# Patient Record
Sex: MALE | Race: WHITE | NOT HISPANIC OR LATINO | Employment: OTHER | RURAL
[De-identification: names, ages, dates, MRNs, and addresses within clinical notes are randomized per-mention and may not be internally consistent; named-entity substitution may affect disease eponyms.]

---

## 2021-05-03 ENCOUNTER — APPOINTMENT (OUTPATIENT)
Dept: LAB | Facility: CLINIC | Age: 71
End: 2021-05-03
Payer: MEDICARE

## 2021-05-03 DIAGNOSIS — Z00.00 ROUTINE GENERAL MEDICAL EXAMINATION AT A HEALTH CARE FACILITY: Primary | ICD-10-CM

## 2021-05-03 LAB
CTP QC/QA: YES
SARS-COV-2 AG RESP QL IA.RAPID: NEGATIVE

## 2021-05-03 PROCEDURE — 87426 SARSCOV CORONAVIRUS AG IA: CPT | Mod: RHCUB | Performed by: FAMILY MEDICINE

## 2022-10-25 ENCOUNTER — OFFICE VISIT (OUTPATIENT)
Dept: DERMATOLOGY | Facility: CLINIC | Age: 72
End: 2022-10-25
Payer: MEDICARE

## 2022-10-25 VITALS — BODY MASS INDEX: 31.39 KG/M2 | HEIGHT: 67 IN | WEIGHT: 200 LBS | RESPIRATION RATE: 18 BRPM

## 2022-10-25 DIAGNOSIS — L57.0 ACTINIC KERATOSES: ICD-10-CM

## 2022-10-25 DIAGNOSIS — D48.9 NEOPLASM OF UNCERTAIN BEHAVIOR: Primary | ICD-10-CM

## 2022-10-25 DIAGNOSIS — L82.1 SEBORRHEIC KERATOSES: ICD-10-CM

## 2022-10-25 PROCEDURE — 11311 PR SHAV SKIN LES 0.6-1.0 CM FACE,FACIAL: ICD-10-PCS | Mod: ,,, | Performed by: STUDENT IN AN ORGANIZED HEALTH CARE EDUCATION/TRAINING PROGRAM

## 2022-10-25 PROCEDURE — 17000 PR DESTRUCTION(LASER SURGERY,CRYOSURGERY,CHEMOSURGERY),PREMALIGNANT LESIONS,FIRST LESION: ICD-10-PCS | Mod: XS,,, | Performed by: STUDENT IN AN ORGANIZED HEALTH CARE EDUCATION/TRAINING PROGRAM

## 2022-10-25 PROCEDURE — 99203 PR OFFICE/OUTPT VISIT, NEW, LEVL III, 30-44 MIN: ICD-10-PCS | Mod: 25,,, | Performed by: STUDENT IN AN ORGANIZED HEALTH CARE EDUCATION/TRAINING PROGRAM

## 2022-10-25 PROCEDURE — 17003 DESTRUCTION, PREMALIGNANT LESIONS; SECOND THROUGH 14 LESIONS: ICD-10-PCS | Mod: ,,, | Performed by: STUDENT IN AN ORGANIZED HEALTH CARE EDUCATION/TRAINING PROGRAM

## 2022-10-25 PROCEDURE — 17000 DESTRUCT PREMALG LESION: CPT | Mod: XS,,, | Performed by: STUDENT IN AN ORGANIZED HEALTH CARE EDUCATION/TRAINING PROGRAM

## 2022-10-25 PROCEDURE — 99203 OFFICE O/P NEW LOW 30 MIN: CPT | Mod: 25,,, | Performed by: STUDENT IN AN ORGANIZED HEALTH CARE EDUCATION/TRAINING PROGRAM

## 2022-10-25 PROCEDURE — 11311 SHAVE SKIN LESION 0.6-1.0 CM: CPT | Mod: ,,, | Performed by: STUDENT IN AN ORGANIZED HEALTH CARE EDUCATION/TRAINING PROGRAM

## 2022-10-25 PROCEDURE — 17003 DESTRUCT PREMALG LES 2-14: CPT | Mod: ,,, | Performed by: STUDENT IN AN ORGANIZED HEALTH CARE EDUCATION/TRAINING PROGRAM

## 2022-10-25 RX ORDER — MULTIVITAMIN
1 TABLET ORAL
COMMUNITY

## 2022-10-25 RX ORDER — ALBUTEROL SULFATE 0.63 MG/3ML
0.63 SOLUTION RESPIRATORY (INHALATION) EVERY 6 HOURS PRN
COMMUNITY

## 2022-10-25 RX ORDER — MONTELUKAST SODIUM 10 MG/1
TABLET ORAL
COMMUNITY
Start: 2021-12-17

## 2022-10-25 RX ORDER — ESOMEPRAZOLE MAGNESIUM 40 MG/1
1 CAPSULE, DELAYED RELEASE ORAL
COMMUNITY

## 2022-10-25 RX ORDER — TOCILIZUMAB 20 MG/ML
INJECTION, SOLUTION, CONCENTRATE INTRAVENOUS
COMMUNITY

## 2022-10-25 RX ORDER — AMLODIPINE BESYLATE 10 MG/1
10 TABLET ORAL DAILY
COMMUNITY

## 2022-10-25 RX ORDER — ESOMEPRAZOLE MAGNESIUM 40 MG/1
40 CAPSULE, DELAYED RELEASE ORAL
COMMUNITY

## 2022-10-25 RX ORDER — NEBIVOLOL 10 MG/1
10 TABLET ORAL DAILY
COMMUNITY

## 2022-10-25 RX ORDER — LOSARTAN POTASSIUM 100 MG/1
1 TABLET ORAL
COMMUNITY

## 2022-10-25 RX ORDER — CLONAZEPAM 1 MG/1
TABLET ORAL
COMMUNITY
Start: 2022-02-18 | End: 2023-02-13

## 2022-10-25 RX ORDER — LATANOPROST 0.05 MG/ML
1 SOLUTION/ DROPS OPHTHALMIC; TOPICAL NIGHTLY
COMMUNITY
Start: 2022-09-02

## 2022-10-25 NOTE — PROGRESS NOTES
[facility]  Rigo Serra MD    [address] [address]  [phone]    Fax: [phone]    Patient Name: Vel Torres  Medical Record Number: [id number]  PCP: Ellis Hernández MD  Age: 72 y.o. : 1950  Contact: [phone] (home)     CC: skin lesion  History of Present Illness:     Vel Torres is a 72 y.o.  male with no history of skin cancer  who presents to clinic today for skin lesion on face.  This has been present for over three months. Symptoms include none. Previous treatments include none. Other concerns today are patient is wanting a full skin exam related to having several skin lesions on back he is concerned about as well.      The patient has no other concerns today.    Review of Systems:     Unremarkable other than mentioned above.     Physical Exam:     General: Relaxed, oriented, alert    Skin examination of the scalp, face, neck, chest, back, abdomen, upper extremities and lower extremities were normal except for as listed below          Assessment and Plan:     1. Actinic Keratoses  Erythematous, scaly papules on left neck, right cheek ( pigmented ), right temple, right forehead, left arm, right arm     Plan: Liquid Nitrogen.  A total of 10 lesions were treated with liquid nitrogen for 2 freeze-thaw cycles lasting 5 seconds, located on the above locations.   The patient's consent was obtained including but not limited to risks of crusting, scabbing,  blistering, scarring, darker or lighter pigmentary change, recurrence, incomplete removal and infection.    Counseling.  Sun protective clothing and broad spectrum sunscreen can prevent the formation of AK.   AKs can be treated with cryotherapy, photodynamic therapy, imiquimod, topical 5-FU.  Actinic Keratoses are precancerous proliferations that occur within sun damaged skin. If untreated,  a small subset of AKs can develop into Squamous Cell Carcinoma.      2. Neoplasm of  Uncertain Behavior   - irregular brown papule located on the right forehead (0.8 cm)  Ddx includes: Sk vs atypical nevus     Shave removal    Pre-procedure Diagnosis: as above  Post_procedure Diagnosis: same  Estimated Blood Loss: <1cc    Findings: None  Complications: None  Specimens: to pathology      Written informed consent was obtained after discussing risks including pain, bleeding, infection, recurrence and scarring. The biopsy site was sterilely prepped with alcohol, which was allowed to dry completely, then locally infiltrated with 1% lidocaine with epinephrine, ~3 cc total. A shave removal was obtained using a Dermablade/15 and the specimen was sent to dermatopathology. Aluminum chloride was used for hemostasis. Antibiotic ointment and a clean dressing were applied. The patient tolerated the procedure well without complications. Verbal and written wound care instructions were given.    Rigo Serra MD         3. Cherry Angiomas  - Scattered bright pink papules    Plan: Counseling  I counseled the patient regarding the diagnosis including that cherry angiomas are benign skin growths requiring no treatment. Patients get more as they age.      Return to clinic in 1 year FSE    AVS printed with patient instructions     Rigo Serra MD   Mohs Surgery/Dermatologic Oncology  Dermatology

## 2022-12-01 DIAGNOSIS — G47.30 SLEEP APNEA, UNSPECIFIED: Primary | ICD-10-CM

## 2022-12-12 ENCOUNTER — PROCEDURE VISIT (OUTPATIENT)
Dept: SLEEP MEDICINE | Facility: HOSPITAL | Age: 72
End: 2022-12-12
Attending: INTERNAL MEDICINE
Payer: MEDICARE

## 2022-12-12 DIAGNOSIS — G47.30 SLEEP APNEA, UNSPECIFIED: ICD-10-CM

## 2022-12-12 PROCEDURE — 95811 POLYSOM 6/>YRS CPAP 4/> PARM: CPT | Mod: PO

## 2022-12-13 NOTE — PROCEDURES
Procedures  Split Night sleep summary included in technical report. It has been uploaded to [employer].

## 2023-08-22 ENCOUNTER — OFFICE VISIT (OUTPATIENT)
Dept: OTOLARYNGOLOGY | Facility: CLINIC | Age: 73
End: 2023-08-22
Payer: MEDICARE

## 2023-08-22 VITALS — BODY MASS INDEX: 31.39 KG/M2 | HEIGHT: 67 IN | WEIGHT: 200 LBS

## 2023-08-22 DIAGNOSIS — J31.0 CHRONIC RHINITIS: Primary | ICD-10-CM

## 2023-08-22 DIAGNOSIS — R09.81 CHRONIC NASAL CONGESTION: ICD-10-CM

## 2023-08-22 DIAGNOSIS — J32.9 CHRONIC SINUSITIS, UNSPECIFIED LOCATION: ICD-10-CM

## 2023-08-22 PROCEDURE — 99204 OFFICE O/P NEW MOD 45 MIN: CPT | Mod: S$PBB,,, | Performed by: OTOLARYNGOLOGY

## 2023-08-22 PROCEDURE — 99204 PR OFFICE/OUTPT VISIT, NEW, LEVL IV, 45-59 MIN: ICD-10-PCS | Mod: S$PBB,,, | Performed by: OTOLARYNGOLOGY

## 2023-08-22 PROCEDURE — 99214 OFFICE O/P EST MOD 30 MIN: CPT | Mod: PBBFAC | Performed by: OTOLARYNGOLOGY

## 2023-08-22 RX ORDER — AZELASTINE 1 MG/ML
1 SPRAY, METERED NASAL 2 TIMES DAILY
Qty: 30 ML | Refills: 1 | Status: SHIPPED | OUTPATIENT
Start: 2023-08-22 | End: 2024-08-21

## 2023-08-22 NOTE — PROGRESS NOTES
"Subjective:       Patient ID: Vel Torres is a 73 y.o. male.    Chief Complaint: Sinusitis (Patient complaining of sinus being "stopped up". States Allegra D and Edahist gave no relief.)  2 sinus surgeries before   Sinusitis  Associated symptoms include congestion and sinus pressure.     Review of Systems   HENT:  Positive for congestion, sinus pressure and sinus pain.    All other systems reviewed and are negative.      Objective:      Physical Exam  General: NAD  Head: Normocephalic, atraumatic, no facial asymmetry/normal strength,  Ears: Both auricules normal in appearance, w/o deformities tympanic membranes normal external auditory canals normal  Nose: External nose w/o deformities very congested  turbinates +inflammation  Oral Cavity: Lips, gums, floor of mouth, tongue hard palate, and buccal mucosa without mass/lesion  Oropharynx: Mucosa pink and moist, soft palate, posterior pharynx and oropharyngeal wall without mass/lesion  Neck: Supple, symmetric, trachea midline, no palpable mass/lesion, no palpable cervical lymphadenopathy  Skin: Warm and dry, no concerning lesions  Respiratory: Respirations even, unlabored   Assessment:       1. Chronic rhinitis    2. Chronic sinusitis, unspecified location    3. Chronic nasal congestion        Plan:       On multiple meds antibiotics etc no relief of congestion will get RAST   CT sinus and start astelin    "

## 2023-08-30 DIAGNOSIS — R09.81 CHRONIC NASAL CONGESTION: ICD-10-CM

## 2023-08-30 DIAGNOSIS — J32.9 CHRONIC SINUSITIS, UNSPECIFIED LOCATION: ICD-10-CM

## 2023-08-30 DIAGNOSIS — J31.0 CHRONIC RHINITIS: Primary | ICD-10-CM

## 2023-09-12 ENCOUNTER — HOSPITAL ENCOUNTER (OUTPATIENT)
Dept: RADIOLOGY | Facility: HOSPITAL | Age: 73
Discharge: HOME | End: 2023-09-12
Attending: OTOLARYNGOLOGY
Payer: MEDICARE

## 2023-09-12 ENCOUNTER — OFFICE VISIT (OUTPATIENT)
Dept: OTOLARYNGOLOGY | Facility: CLINIC | Age: 73
End: 2023-09-12
Payer: MEDICARE

## 2023-09-12 VITALS — BODY MASS INDEX: 31.39 KG/M2 | WEIGHT: 200 LBS | HEIGHT: 67 IN

## 2023-09-12 DIAGNOSIS — J32.9 CHRONIC SINUSITIS, UNSPECIFIED LOCATION: Primary | ICD-10-CM

## 2023-09-12 DIAGNOSIS — J33.9 NASAL POLYPS: ICD-10-CM

## 2023-09-12 DIAGNOSIS — J30.1 SEASONAL ALLERGIC RHINITIS DUE TO POLLEN: ICD-10-CM

## 2023-09-12 DIAGNOSIS — R09.81 CHRONIC NASAL CONGESTION: ICD-10-CM

## 2023-09-12 DIAGNOSIS — J32.9 CHRONIC SINUSITIS, UNSPECIFIED LOCATION: ICD-10-CM

## 2023-09-12 PROCEDURE — 99214 PR OFFICE/OUTPT VISIT, EST, LEVL IV, 30-39 MIN: ICD-10-PCS | Mod: S$PBB,,, | Performed by: OTOLARYNGOLOGY

## 2023-09-12 PROCEDURE — 70486 CT MAXILLOFACIAL W/O DYE: CPT | Mod: 26,,, | Performed by: RADIOLOGY

## 2023-09-12 PROCEDURE — 99214 OFFICE O/P EST MOD 30 MIN: CPT | Mod: S$PBB,,, | Performed by: OTOLARYNGOLOGY

## 2023-09-12 PROCEDURE — 99214 OFFICE O/P EST MOD 30 MIN: CPT | Mod: PBBFAC,25 | Performed by: OTOLARYNGOLOGY

## 2023-09-12 PROCEDURE — 70486 CT SINUSES WITHOUT CONTRAST: ICD-10-PCS | Mod: 26,,, | Performed by: RADIOLOGY

## 2023-09-12 PROCEDURE — 70486 CT MAXILLOFACIAL W/O DYE: CPT | Mod: TC

## 2023-09-12 NOTE — PROGRESS NOTES
Subjective:       Patient ID: Vel Torres is a 73 y.o. male.    Chief Complaint: Sinusitis (CT Sinus results)  RAST also very positive discussed with pt   Sinusitis  Associated symptoms include congestion and sinus pressure.     Review of Systems   HENT:  Positive for congestion, postnasal drip, rhinorrhea, sinus pressure and sinus pain.    All other systems reviewed and are negative.      Objective:      Physical Exam  General: NAD  Head: Normocephalic, atraumatic, no facial asymmetry/normal strength,  Ears: Both auricules normal in appearance, w/o deformities tympanic membranes normal external auditory canals normal  Nose: External nose w/o deformities polypoid tissue left  Oral Cavity: Lips, gums, floor of mouth, tongue hard palate, and buccal mucosa without mass/lesion  Oropharynx: Mucosa pink and moist, soft palate, posterior pharynx and oropharyngeal wall without mass/lesion  Neck: Supple, symmetric, trachea midline, no palpable mass/lesion, no palpable cervical lymphadenopathy  Skin: Warm and dry, no concerning lesions  Respiratory: Respirations even, unlabored   Assessment:       1. Chronic sinusitis, unspecified location    2. Chronic nasal congestion    3. Nasal polyps    4. Seasonal allergic rhinitis due to pollen        Plan:       Reviewed CT sinus pansinusitis with nasal polyps   Will Do FESS in OR   Start allergy shots also

## 2023-09-29 ENCOUNTER — ANESTHESIA (OUTPATIENT)
Dept: SURGERY | Facility: HOSPITAL | Age: 73
End: 2023-09-29
Payer: MEDICARE

## 2023-09-29 ENCOUNTER — ANESTHESIA EVENT (OUTPATIENT)
Dept: SURGERY | Facility: HOSPITAL | Age: 73
End: 2023-09-29
Payer: MEDICARE

## 2023-09-29 ENCOUNTER — HOSPITAL ENCOUNTER (OUTPATIENT)
Facility: HOSPITAL | Age: 73
Discharge: HOME | End: 2023-09-29
Attending: OTOLARYNGOLOGY | Admitting: OTOLARYNGOLOGY
Payer: MEDICARE

## 2023-09-29 VITALS
HEART RATE: 62 BPM | RESPIRATION RATE: 14 BRPM | HEIGHT: 67 IN | SYSTOLIC BLOOD PRESSURE: 132 MMHG | OXYGEN SATURATION: 96 % | DIASTOLIC BLOOD PRESSURE: 61 MMHG | BODY MASS INDEX: 29.82 KG/M2 | TEMPERATURE: 98 F | WEIGHT: 190 LBS

## 2023-09-29 VITALS
DIASTOLIC BLOOD PRESSURE: 53 MMHG | SYSTOLIC BLOOD PRESSURE: 102 MMHG | RESPIRATION RATE: 12 BRPM | OXYGEN SATURATION: 97 % | HEART RATE: 84 BPM

## 2023-09-29 DIAGNOSIS — J32.4 CHRONIC PANSINUSITIS: Primary | ICD-10-CM

## 2023-09-29 DIAGNOSIS — J32.9 CHRONIC SINUSITIS: ICD-10-CM

## 2023-09-29 DIAGNOSIS — J32.9 CHRONIC SINUSITIS, UNSPECIFIED LOCATION: ICD-10-CM

## 2023-09-29 PROCEDURE — 27000655: Performed by: NURSE ANESTHETIST, CERTIFIED REGISTERED

## 2023-09-29 PROCEDURE — D9220A PRA ANESTHESIA: Mod: CRNA,,, | Performed by: NURSE ANESTHETIST, CERTIFIED REGISTERED

## 2023-09-29 PROCEDURE — 36000709 HC OR TIME LEV III EA ADD 15 MIN: Performed by: OTOLARYNGOLOGY

## 2023-09-29 PROCEDURE — 63600175 PHARM REV CODE 636 W HCPCS: Performed by: NURSE ANESTHETIST, CERTIFIED REGISTERED

## 2023-09-29 PROCEDURE — D9220A PRA ANESTHESIA: ICD-10-PCS | Mod: CRNA,,, | Performed by: NURSE ANESTHETIST, CERTIFIED REGISTERED

## 2023-09-29 PROCEDURE — 31267 ENDOSCOPY MAXILLARY SINUS: CPT | Mod: 50,51,, | Performed by: OTOLARYNGOLOGY

## 2023-09-29 PROCEDURE — 88305 SURGICAL PATHOLOGY: ICD-10-PCS | Mod: 26,,, | Performed by: PATHOLOGY

## 2023-09-29 PROCEDURE — D9220A PRA ANESTHESIA: Mod: ANES,,, | Performed by: ANESTHESIOLOGY

## 2023-09-29 PROCEDURE — 37000009 HC ANESTHESIA EA ADD 15 MINS: Performed by: OTOLARYNGOLOGY

## 2023-09-29 PROCEDURE — 88312 SPECIAL STAINS GROUP 1: CPT | Mod: 26,,, | Performed by: PATHOLOGY

## 2023-09-29 PROCEDURE — 36000708 HC OR TIME LEV III 1ST 15 MIN: Performed by: OTOLARYNGOLOGY

## 2023-09-29 PROCEDURE — 88312 SURGICAL PATHOLOGY: ICD-10-PCS | Mod: 26,,, | Performed by: PATHOLOGY

## 2023-09-29 PROCEDURE — 25000003 PHARM REV CODE 250: Performed by: OTOLARYNGOLOGY

## 2023-09-29 PROCEDURE — 71000033 HC RECOVERY, INTIAL HOUR: Performed by: OTOLARYNGOLOGY

## 2023-09-29 PROCEDURE — 25000003 PHARM REV CODE 250: Performed by: NURSE ANESTHETIST, CERTIFIED REGISTERED

## 2023-09-29 PROCEDURE — 31259 PR ENDOSCOPY, NASAL/SINUS, W/ETHMOIDECTOMY/SPHENOIDOTOMY/TISS REMVL: ICD-10-PCS | Mod: 50,,, | Performed by: OTOLARYNGOLOGY

## 2023-09-29 PROCEDURE — 88305 TISSUE EXAM BY PATHOLOGIST: CPT | Mod: TC,SUR | Performed by: OTOLARYNGOLOGY

## 2023-09-29 PROCEDURE — 31259 NSL/SINS NDSC SPHN TISS RMVL: CPT | Mod: 50,,, | Performed by: OTOLARYNGOLOGY

## 2023-09-29 PROCEDURE — 27000165 HC TUBE, ETT CUFFED: Performed by: NURSE ANESTHETIST, CERTIFIED REGISTERED

## 2023-09-29 PROCEDURE — 27000689 HC BLADE LARYNGOSCOPE ANY SIZE: Performed by: NURSE ANESTHETIST, CERTIFIED REGISTERED

## 2023-09-29 PROCEDURE — 31267 PR NASAL/SINUS ENDOSCOPY,RMV TISS MAXILL SINUS: ICD-10-PCS | Mod: 50,51,, | Performed by: OTOLARYNGOLOGY

## 2023-09-29 PROCEDURE — D9220A PRA ANESTHESIA: ICD-10-PCS | Mod: ANES,,, | Performed by: ANESTHESIOLOGY

## 2023-09-29 PROCEDURE — 27000510 HC BLANKET BAIR HUGGER ANY SIZE: Performed by: NURSE ANESTHETIST, CERTIFIED REGISTERED

## 2023-09-29 PROCEDURE — 71000015 HC POSTOP RECOV 1ST HR: Performed by: OTOLARYNGOLOGY

## 2023-09-29 PROCEDURE — 37000008 HC ANESTHESIA 1ST 15 MINUTES: Performed by: OTOLARYNGOLOGY

## 2023-09-29 PROCEDURE — 88305 TISSUE EXAM BY PATHOLOGIST: CPT | Mod: 26,,, | Performed by: PATHOLOGY

## 2023-09-29 PROCEDURE — 27000716 HC OXISENSOR PROBE, ANY SIZE: Performed by: NURSE ANESTHETIST, CERTIFIED REGISTERED

## 2023-09-29 RX ORDER — ONDANSETRON 2 MG/ML
INJECTION INTRAMUSCULAR; INTRAVENOUS
Status: DISCONTINUED | OUTPATIENT
Start: 2023-09-29 | End: 2023-09-29

## 2023-09-29 RX ORDER — MEPERIDINE HYDROCHLORIDE 25 MG/ML
25 INJECTION INTRAMUSCULAR; INTRAVENOUS; SUBCUTANEOUS EVERY 10 MIN PRN
Status: DISCONTINUED | OUTPATIENT
Start: 2023-09-29 | End: 2023-09-29 | Stop reason: HOSPADM

## 2023-09-29 RX ORDER — ONDANSETRON 2 MG/ML
4 INJECTION INTRAMUSCULAR; INTRAVENOUS DAILY PRN
Status: DISCONTINUED | OUTPATIENT
Start: 2023-09-29 | End: 2023-09-29 | Stop reason: HOSPADM

## 2023-09-29 RX ORDER — PROPOFOL 10 MG/ML
VIAL (ML) INTRAVENOUS
Status: DISCONTINUED | OUTPATIENT
Start: 2023-09-29 | End: 2023-09-29

## 2023-09-29 RX ORDER — LIDOCAINE HYDROCHLORIDE 20 MG/ML
INJECTION, SOLUTION EPIDURAL; INFILTRATION; INTRACAUDAL; PERINEURAL
Status: DISCONTINUED | OUTPATIENT
Start: 2023-09-29 | End: 2023-09-29

## 2023-09-29 RX ORDER — HYDROCODONE BITARTRATE AND ACETAMINOPHEN 7.5; 325 MG/1; MG/1
1 TABLET ORAL EVERY 6 HOURS PRN
Status: DISCONTINUED | OUTPATIENT
Start: 2023-09-29 | End: 2023-09-29 | Stop reason: HOSPADM

## 2023-09-29 RX ORDER — GLYCOPYRROLATE 0.2 MG/ML
INJECTION INTRAMUSCULAR; INTRAVENOUS
Status: DISCONTINUED | OUTPATIENT
Start: 2023-09-29 | End: 2023-09-29

## 2023-09-29 RX ORDER — MORPHINE SULFATE 10 MG/ML
4 INJECTION INTRAMUSCULAR; INTRAVENOUS; SUBCUTANEOUS EVERY 5 MIN PRN
Status: DISCONTINUED | OUTPATIENT
Start: 2023-09-29 | End: 2023-09-29 | Stop reason: HOSPADM

## 2023-09-29 RX ORDER — CEFAZOLIN SODIUM 1 G/3ML
INJECTION, POWDER, FOR SOLUTION INTRAMUSCULAR; INTRAVENOUS
Status: DISCONTINUED | OUTPATIENT
Start: 2023-09-29 | End: 2023-09-29

## 2023-09-29 RX ORDER — DIPHENHYDRAMINE HYDROCHLORIDE 50 MG/ML
25 INJECTION INTRAMUSCULAR; INTRAVENOUS EVERY 6 HOURS PRN
Status: DISCONTINUED | OUTPATIENT
Start: 2023-09-29 | End: 2023-09-29 | Stop reason: HOSPADM

## 2023-09-29 RX ORDER — DEXAMETHASONE SODIUM PHOSPHATE 4 MG/ML
INJECTION, SOLUTION INTRA-ARTICULAR; INTRALESIONAL; INTRAMUSCULAR; INTRAVENOUS; SOFT TISSUE
Status: DISCONTINUED | OUTPATIENT
Start: 2023-09-29 | End: 2023-09-29

## 2023-09-29 RX ORDER — SODIUM CHLORIDE 9 MG/ML
INJECTION, SOLUTION INTRAVENOUS CONTINUOUS
Status: DISPENSED | OUTPATIENT
Start: 2023-09-29

## 2023-09-29 RX ORDER — ROCURONIUM BROMIDE 10 MG/ML
INJECTION, SOLUTION INTRAVENOUS
Status: DISCONTINUED | OUTPATIENT
Start: 2023-09-29 | End: 2023-09-29

## 2023-09-29 RX ORDER — HYDROMORPHONE HYDROCHLORIDE 2 MG/ML
0.5 INJECTION, SOLUTION INTRAMUSCULAR; INTRAVENOUS; SUBCUTANEOUS EVERY 5 MIN PRN
Status: DISCONTINUED | OUTPATIENT
Start: 2023-09-29 | End: 2023-09-29 | Stop reason: HOSPADM

## 2023-09-29 RX ORDER — FENTANYL CITRATE 50 UG/ML
INJECTION, SOLUTION INTRAMUSCULAR; INTRAVENOUS
Status: DISCONTINUED | OUTPATIENT
Start: 2023-09-29 | End: 2023-09-29

## 2023-09-29 RX ADMIN — CEFAZOLIN 2 G: 1 INJECTION, POWDER, FOR SOLUTION INTRAMUSCULAR; INTRAVENOUS; PARENTERAL at 10:09

## 2023-09-29 RX ADMIN — FENTANYL CITRATE 50 MCG: 50 INJECTION INTRAMUSCULAR; INTRAVENOUS at 09:09

## 2023-09-29 RX ADMIN — DEXAMETHASONE SODIUM PHOSPHATE 20 MG: 4 INJECTION, SOLUTION INTRA-ARTICULAR; INTRALESIONAL; INTRAMUSCULAR; INTRAVENOUS; SOFT TISSUE at 10:09

## 2023-09-29 RX ADMIN — SODIUM CHLORIDE: 9 INJECTION, SOLUTION INTRAVENOUS at 06:09

## 2023-09-29 RX ADMIN — PROPOFOL 175 MG: 10 INJECTION, EMULSION INTRAVENOUS at 09:09

## 2023-09-29 RX ADMIN — SUGAMMADEX 200 MG: 100 INJECTION, SOLUTION INTRAVENOUS at 10:09

## 2023-09-29 RX ADMIN — SODIUM CHLORIDE: 9 INJECTION, SOLUTION INTRAVENOUS at 09:09

## 2023-09-29 RX ADMIN — ROCURONIUM BROMIDE 50 MG: 10 INJECTION, SOLUTION INTRAVENOUS at 09:09

## 2023-09-29 RX ADMIN — GLYCOPYRROLATE 0.2 MG: 0.2 INJECTION INTRAMUSCULAR; INTRAVENOUS at 10:09

## 2023-09-29 RX ADMIN — ONDANSETRON 4 MG: 2 INJECTION INTRAMUSCULAR; INTRAVENOUS at 10:09

## 2023-09-29 RX ADMIN — LIDOCAINE HYDROCHLORIDE 70 MG: 20 INJECTION, SOLUTION INTRAVENOUS at 09:09

## 2023-09-29 NOTE — OP NOTE
Surgery Date: 9/29/2023     Surgeon(s) and Role:     * Magan Wild MD - Primary    Assisting Surgeon: None    Pre-op Diagnosis:  Chronic sinusitis, unspecified location [J32.9]    Post-op Diagnosis:  Post-Op Diagnosis Codes:     * Chronic sinusitis, unspecified location [J32.9]    Procedure(s) (LRB):  FESS (FUNCTIONAL ENDOSCOPIC SINUS SURGERY) (Bilateral)    After general ET anesthesia the patient was decongested with neosynephrine soaked patties the 0 and 30 degree scopes were used to view the sinuses the left side was performed first using the scope for visualization the ethmoid bulla was opened thickened mucosal disease was removed from the posterior cells coming forward to clear anterior cells as needed with care to stay medial to lamina papyracea next staying medial to middle turbinate the sphenoid sinus natural opening was enlarged with thick mucous removed  Next the maxillary sinus was entered through it's natural ostium which was enlarged and thickened debris removed from opening and inside the sinus it was thoroughly irrigated with saline. The right side was done in a similar manner. Right maxillary had fungal debris that was removed There was no further bleeding the patient was reversed taken to RR in stable condition.     Anesthesia: General    Operative Findings: Fungal debris thick mucous    Estimated Blood Loss: 10 ml

## 2023-09-29 NOTE — ANESTHESIA PREPROCEDURE EVALUATION
09/29/2023  Vel Torres is a 73 y.o., male.      Pre-op Assessment    I have reviewed the Patient Summary Reports.    I have reviewed the NPO Status.   I have reviewed the Medications.     Review of Systems         Anesthesia Plan  Type of Anesthesia, risks & benefits discussed:    Anesthesia Type: Gen ETT  Intra-op Monitoring Plan: Standard ASA Monitors  Post Op Pain Control Plan: IV/PO Opioids PRN  Induction:  IV  Informed Consent: Informed consent signed with the Patient and all parties understand the risks and agree with anesthesia plan.  All questions answered.   ASA Score: 3    Ready For Surgery From Anesthesia Perspective.     .  NPO greater than 8 hours  NAC  Allergic to naproxen    FELISA (CPAP)  Asthma  HTN  R.A.  Glaucoma  GERD    Airway exam deferred (COVID precautions); adequate ROM at neck.

## 2023-09-29 NOTE — BRIEF OP NOTE
[facility] - Orthopedic Periop Services  Brief Operative Note    Surgery Date: 9/29/2023     Surgeon(s) and Role:     * Magan Wild MD - Primary    Assisting Surgeon: None    Pre-op Diagnosis:  Chronic sinusitis, unspecified location [J32.9]    Post-op Diagnosis:  Post-Op Diagnosis Codes:     * Chronic sinusitis, unspecified location [J32.9]    Procedure(s) (LRB):  FESS (FUNCTIONAL ENDOSCOPIC SINUS SURGERY) (Bilateral)    Anesthesia: General    Operative Findings: Fungal debris thick mucous    Estimated Blood Loss: 10 ml  Specimens:   Specimen (24h ago, onward)       Start     Ordered    09/29/23 1008  Surgical Pathology  RELEASE UPON ORDERING         09/29/23 1008                      Discharge Note    OUTCOME: Patient tolerated treatment/procedure well without complication and is now ready for discharge.    DISPOSITION: Home or Self Care    FINAL DIAGNOSIS: Chronic sinusitis  FOLLOWUP: In clinic    DISCHARGE INSTRUCTIONS:  No discharge procedures on file.

## 2023-09-29 NOTE — TRANSFER OF CARE
"Anesthesia Transfer of Care Note    Patient: Vel Torres    Procedure(s) Performed: Procedure(s) (LRB):  FESS (FUNCTIONAL ENDOSCOPIC SINUS SURGERY) (Bilateral)    Patient location: PACU    Anesthesia Type: general    Transport from OR: Transported from OR on 6-10 L/min O2 by face mask with adequate spontaneous ventilation    Post pain: adequate analgesia    Post assessment: no apparent anesthetic complications    Post vital signs: stable    Level of consciousness: awake, alert and oriented    Nausea/Vomiting: no nausea/vomiting    Complications: none    Transfer of care protocol was followedComments: Good SV continue, NAD noted, VSS, RTRN      Last vitals:   Visit Vitals  BP (!) 160/79 (BP Location: Right arm, Patient Position: Lying)   Pulse 77   Temp 36.7 °C (98 °F) (Oral)   Resp 13   Ht 5' 7" (1.702 m)   Wt 86.2 kg (190 lb)   SpO2 99%   BMI 29.76 kg/m²     "

## 2023-09-29 NOTE — ANESTHESIA PROCEDURE NOTES
Intubation    Date/Time: 9/29/2023 9:58 AM    Performed by: Walter Marcano CRNA  Authorized by: Fidel Kinsey MD    Intubation:     Induction:  Intravenous    Intubated:  Postinduction    Mask Ventilation:  Easy mask    Attempts:  1    Attempted By:  CRNA    Method of Intubation:  Direct    Blade:  Jenaro 3    Laryngeal View Grade: Grade IIA - cords partially seen      Difficult Airway Encountered?: No      Complications:  None    Airway Device:  Oral endotracheal tube    Airway Device Size:  7.5    Style/Cuff Inflation:  Cuffed (inflated to minimal occlusive pressure)    Inflation Amount (mL):  7    Tube secured:  21    Secured at:  The lips    Placement Verified By:  Capnometry    Complicating Factors:  None    Findings Post-Intubation:  BS equal bilateral and atraumatic/condition of teeth unchanged  Notes:      Smooth, tolerated well

## 2023-10-03 LAB
ESTROGEN SERPL-MCNC: NORMAL PG/ML
INSULIN SERPL-ACNC: NORMAL U[IU]/ML
LAB AP GROSS DESCRIPTION: NORMAL
LAB AP LABORATORY NOTES: NORMAL
T3RU NFR SERPL: NORMAL %

## 2023-10-03 NOTE — ANESTHESIA POSTPROCEDURE EVALUATION
Anesthesia Post Evaluation    Patient: Vel Torres    Procedure(s) Performed: Procedure(s) (LRB):  FESS (FUNCTIONAL ENDOSCOPIC SINUS SURGERY) (Bilateral)    Final Anesthesia Type: general      Patient location during evaluation: PACU  Post-procedure vital signs: reviewed and stable  Pain management: adequate  Airway patency: patent    PONV status at discharge: No PONV  Anesthetic complications: no      Cardiovascular status: hemodynamically stable  Respiratory status: unassisted  Hydration status: euvolemic  Follow-up not needed.          Vitals Value Taken Time   /61 09/29/23 1146   Temp 36.7 °C (98 °F) 09/29/23 1034   Pulse 60 09/29/23 1157   Resp 15 09/29/23 1105   SpO2 94 % 09/29/23 1157   Vitals shown include unvalidated device data.      Event Time   Out of Recovery 11:05:46         Pain/Presley Score: No data recorded

## 2023-10-09 ENCOUNTER — OFFICE VISIT (OUTPATIENT)
Dept: OTOLARYNGOLOGY | Facility: CLINIC | Age: 73
End: 2023-10-09
Payer: MEDICARE

## 2023-10-09 VITALS — WEIGHT: 190 LBS | HEIGHT: 67 IN | BODY MASS INDEX: 29.82 KG/M2

## 2023-10-09 DIAGNOSIS — J32.8 OTHER CHRONIC SINUSITIS: Primary | ICD-10-CM

## 2023-10-09 PROCEDURE — 99213 OFFICE O/P EST LOW 20 MIN: CPT | Mod: S$PBB,,, | Performed by: OTOLARYNGOLOGY

## 2023-10-09 PROCEDURE — 99213 PR OFFICE/OUTPT VISIT, EST, LEVL III, 20-29 MIN: ICD-10-PCS | Mod: S$PBB,,, | Performed by: OTOLARYNGOLOGY

## 2023-10-09 PROCEDURE — 99213 OFFICE O/P EST LOW 20 MIN: CPT | Mod: PBBFAC | Performed by: OTOLARYNGOLOGY

## 2023-10-09 NOTE — PROGRESS NOTES
Subjective:       Patient ID: Vel Torres is a 73 y.o. male.    Chief Complaint: Follow-up (Post-op FESS. Voices no complaints.)  Breathing better feeling better   Follow-up  Pertinent negatives include no congestion.     Review of Systems   HENT:  Negative for congestion.    All other systems reviewed and are negative.      Objective:      Physical Exam  General: NAD  Head: Normocephalic, atraumatic, no facial asymmetry/normal strength,  Ears: Both auricules normal in appearance, w/o deformities tympanic membranes normal external auditory canals normal  Nose: External nose w/o deformities normal turbinates no drainage or inflammation nose open no crusts   Oral Cavity: Lips, gums, floor of mouth, tongue hard palate, and buccal mucosa without mass/lesion  Oropharynx: Mucosa pink and moist, soft palate, posterior pharynx and oropharyngeal wall without mass/lesion  Neck: Supple, symmetric, trachea midline, no palpable mass/lesion, no palpable cervical lymphadenopathy  Skin: Warm and dry, no concerning lesions  Respiratory: Respirations even, unlabored   Assessment:       1. Other chronic sinusitis        Plan:       Continue nasal spray

## 2023-10-11 ENCOUNTER — CLINICAL SUPPORT (OUTPATIENT)
Dept: OTOLARYNGOLOGY | Facility: CLINIC | Age: 73
End: 2023-10-11
Payer: MEDICARE

## 2023-10-11 DIAGNOSIS — J30.89 ALLERGIC RHINITIS DUE TO DERMATOPHAGOIDES PTERONYSSINUS: ICD-10-CM

## 2023-10-11 DIAGNOSIS — J31.0 CHRONIC RHINITIS: ICD-10-CM

## 2023-10-11 DIAGNOSIS — J32.8 OTHER CHRONIC SINUSITIS: Primary | ICD-10-CM

## 2023-10-11 DIAGNOSIS — J30.89 ENVIRONMENTAL AND SEASONAL ALLERGIES: ICD-10-CM

## 2023-10-11 DIAGNOSIS — J30.1 SEASONAL ALLERGIC RHINITIS DUE TO POLLEN: ICD-10-CM

## 2023-10-11 DIAGNOSIS — J30.81 ALLERGIC RHINITIS DUE TO DOG HAIR: ICD-10-CM

## 2023-10-11 DIAGNOSIS — J30.1 NON-SEASONAL ALLERGIC RHINITIS DUE TO POLLEN: ICD-10-CM

## 2023-10-11 DIAGNOSIS — J30.89 OTHER ALLERGIC RHINITIS: ICD-10-CM

## 2023-10-11 DIAGNOSIS — J30.1 ALLERGIC REACTION TO TREE POLLEN: ICD-10-CM

## 2023-10-11 DIAGNOSIS — J30.89 ALLERGIC RHINITIS DUE TO DERMATOPHAGOIDES FARINAE: ICD-10-CM

## 2023-10-11 DIAGNOSIS — J30.89 ALLERGIC RHINITIS DUE TO MOLD: ICD-10-CM

## 2023-10-11 DIAGNOSIS — J33.9 NASAL POLYPS: ICD-10-CM

## 2023-10-11 DIAGNOSIS — J30.1 ALLERGIC RHINITIS DUE TO WEED POLLEN: ICD-10-CM

## 2023-10-11 DIAGNOSIS — J30.81 ALLERGY TO DOG DANDER: ICD-10-CM

## 2023-10-11 DIAGNOSIS — R09.81 CHRONIC NASAL CONGESTION: ICD-10-CM

## 2023-10-11 DIAGNOSIS — J30.81 ALLERGIC RHINITIS DUE TO DOGS: ICD-10-CM

## 2023-10-11 DIAGNOSIS — J30.1 ALLERGY TO TREES: ICD-10-CM

## 2023-10-11 DIAGNOSIS — J30.1 ALLERGIC RHINITIS DUE TO GRASS POLLEN: ICD-10-CM

## 2023-10-11 DIAGNOSIS — J30.89 ALLERGIC RHINITIS DUE TO INSECT: ICD-10-CM

## 2023-10-11 DIAGNOSIS — J30.81 PET ALLERGY: ICD-10-CM

## 2023-10-11 DIAGNOSIS — J30.1 ALLERGIC REACTION TO GRASS POLLEN: ICD-10-CM

## 2023-10-11 DIAGNOSIS — J30.1 ALLERGIC RHINITIS DUE TO TREE POLLEN: ICD-10-CM

## 2023-10-11 DIAGNOSIS — J30.81 ALLERGY TO DOGS: ICD-10-CM

## 2023-10-11 DIAGNOSIS — J30.81 ALLERGIC TO DOGS: ICD-10-CM

## 2023-10-11 DIAGNOSIS — J30.81 ALLERGIC RHINITIS DUE TO ANIMAL HAIR AND DANDER: ICD-10-CM

## 2023-10-11 DIAGNOSIS — J30.1 ALLERGIC REACTION TO WEED POLLEN: ICD-10-CM

## 2023-10-11 DIAGNOSIS — J30.89 ALLERGIC RHINITIS DUE TO HOUSE DUST MITE: ICD-10-CM

## 2023-10-11 PROCEDURE — 95165 ANTIGEN THERAPY SERVICES: CPT | Mod: S$PBB,,, | Performed by: OTOLARYNGOLOGY

## 2023-10-11 PROCEDURE — 95165 PR PROFES SVC,IMMUNOTHER,SINGLE/MULT AGS: ICD-10-PCS | Mod: S$PBB,,, | Performed by: OTOLARYNGOLOGY

## 2023-10-11 PROCEDURE — 95165 ANTIGEN THERAPY SERVICES: CPT | Mod: PBBFAC | Performed by: OTOLARYNGOLOGY

## 2023-10-11 PROCEDURE — 95117 IMMUNOTHERAPY INJECTIONS: CPT | Mod: PBBFAC | Performed by: OTOLARYNGOLOGY

## 2023-10-11 RX ORDER — EPINEPHRINE 0.3 MG/.3ML
1 INJECTION SUBCUTANEOUS ONCE
Qty: 0.3 ML | Refills: 0 | Status: SHIPPED | OUTPATIENT
Start: 2023-10-11 | End: 2023-10-11

## 2023-10-11 NOTE — PROGRESS NOTES
Established patient with Dr. Wild. See previous note for written order. Allergy injections per Dr. Wild.   Vial test administered from new vial A.  10 minute vial test mm reactions  Administered right arm at 10:03 AM  3mm observed   Left arm; 3mm observed.  First dose of 0.02ml given.  20 minute mm reaction  10:03 AM   right arm; observation 5mm   Left arm; observation 4mm.

## 2023-10-19 ENCOUNTER — CLINICAL SUPPORT (OUTPATIENT)
Dept: OTOLARYNGOLOGY | Facility: CLINIC | Age: 73
End: 2023-10-19
Payer: MEDICARE

## 2023-10-19 DIAGNOSIS — J33.9 NASAL POLYPS: ICD-10-CM

## 2023-10-19 DIAGNOSIS — J30.1 ALLERGIC RHINITIS DUE TO WEED POLLEN: ICD-10-CM

## 2023-10-19 DIAGNOSIS — J30.1 ALLERGIC RHINITIS DUE TO GRASS POLLEN: ICD-10-CM

## 2023-10-19 DIAGNOSIS — J32.8 OTHER CHRONIC SINUSITIS: Primary | ICD-10-CM

## 2023-10-19 DIAGNOSIS — J30.1 NON-SEASONAL ALLERGIC RHINITIS DUE TO POLLEN: ICD-10-CM

## 2023-10-19 DIAGNOSIS — J31.0 CHRONIC RHINITIS: ICD-10-CM

## 2023-10-19 DIAGNOSIS — J30.1 SEASONAL ALLERGIC RHINITIS DUE TO POLLEN: ICD-10-CM

## 2023-10-19 DIAGNOSIS — J30.1 ALLERGIC RHINITIS DUE TO TREE POLLEN: ICD-10-CM

## 2023-10-19 DIAGNOSIS — R09.81 CHRONIC NASAL CONGESTION: ICD-10-CM

## 2023-10-19 DIAGNOSIS — J30.1 ALLERGY TO TREES: ICD-10-CM

## 2023-10-19 DIAGNOSIS — J30.1 ALLERGIC REACTION TO TREE POLLEN: ICD-10-CM

## 2023-10-19 DIAGNOSIS — J30.1 ALLERGIC REACTION TO GRASS POLLEN: ICD-10-CM

## 2023-10-19 PROCEDURE — 95117 IMMUNOTHERAPY INJECTIONS: CPT | Mod: PBBFAC | Performed by: OTOLARYNGOLOGY

## 2023-10-19 NOTE — PROGRESS NOTES
Established patient with Dr. Wild. See previous note for written order. Allergy injections per Dr. Wild.   20 minute mm reaction  9:37 AM   right arm; observation 5mm   Left arm; observation 4mm.

## 2023-10-27 ENCOUNTER — CLINICAL SUPPORT (OUTPATIENT)
Dept: OTOLARYNGOLOGY | Facility: CLINIC | Age: 73
End: 2023-10-27
Payer: MEDICARE

## 2023-10-27 DIAGNOSIS — J30.1 ALLERGIC RHINITIS DUE TO TREE POLLEN: ICD-10-CM

## 2023-10-27 DIAGNOSIS — J30.1 ALLERGIC REACTION TO GRASS POLLEN: ICD-10-CM

## 2023-10-27 DIAGNOSIS — J32.8 OTHER CHRONIC SINUSITIS: Primary | ICD-10-CM

## 2023-10-27 DIAGNOSIS — J30.1 NON-SEASONAL ALLERGIC RHINITIS DUE TO POLLEN: ICD-10-CM

## 2023-10-27 DIAGNOSIS — J33.9 NASAL POLYPS: ICD-10-CM

## 2023-10-27 DIAGNOSIS — R09.81 CHRONIC NASAL CONGESTION: ICD-10-CM

## 2023-10-27 DIAGNOSIS — J30.1 ALLERGIC REACTION TO TREE POLLEN: ICD-10-CM

## 2023-10-27 DIAGNOSIS — J30.1 SEASONAL ALLERGIC RHINITIS DUE TO POLLEN: ICD-10-CM

## 2023-10-27 DIAGNOSIS — J31.0 CHRONIC RHINITIS: ICD-10-CM

## 2023-10-27 DIAGNOSIS — J30.1 ALLERGY TO TREES: ICD-10-CM

## 2023-10-27 DIAGNOSIS — J30.1 ALLERGIC RHINITIS DUE TO GRASS POLLEN: ICD-10-CM

## 2023-10-27 DIAGNOSIS — J30.1 ALLERGIC RHINITIS DUE TO WEED POLLEN: ICD-10-CM

## 2023-10-27 PROCEDURE — 95117 IMMUNOTHERAPY INJECTIONS: CPT | Mod: PBBFAC | Performed by: OTOLARYNGOLOGY

## 2023-11-02 ENCOUNTER — CLINICAL SUPPORT (OUTPATIENT)
Dept: OTOLARYNGOLOGY | Facility: CLINIC | Age: 73
End: 2023-11-02
Payer: MEDICARE

## 2023-11-02 DIAGNOSIS — R09.81 CHRONIC NASAL CONGESTION: ICD-10-CM

## 2023-11-02 DIAGNOSIS — J30.1 ALLERGIC RHINITIS DUE TO WEED POLLEN: ICD-10-CM

## 2023-11-02 DIAGNOSIS — J33.9 NASAL POLYPS: ICD-10-CM

## 2023-11-02 DIAGNOSIS — J31.0 CHRONIC RHINITIS: ICD-10-CM

## 2023-11-02 DIAGNOSIS — J30.1 ALLERGY TO TREES: ICD-10-CM

## 2023-11-02 DIAGNOSIS — J32.8 OTHER CHRONIC SINUSITIS: Primary | ICD-10-CM

## 2023-11-02 DIAGNOSIS — J30.1 ALLERGIC RHINITIS DUE TO GRASS POLLEN: ICD-10-CM

## 2023-11-02 DIAGNOSIS — J30.1 NON-SEASONAL ALLERGIC RHINITIS DUE TO POLLEN: ICD-10-CM

## 2023-11-02 DIAGNOSIS — J30.1 ALLERGIC REACTION TO GRASS POLLEN: ICD-10-CM

## 2023-11-02 DIAGNOSIS — J30.1 SEASONAL ALLERGIC RHINITIS DUE TO POLLEN: ICD-10-CM

## 2023-11-02 DIAGNOSIS — J30.1 ALLERGIC RHINITIS DUE TO TREE POLLEN: ICD-10-CM

## 2023-11-02 DIAGNOSIS — J30.1 ALLERGIC REACTION TO TREE POLLEN: ICD-10-CM

## 2023-11-02 PROCEDURE — 95117 IMMUNOTHERAPY INJECTIONS: CPT | Mod: PBBFAC | Performed by: OTOLARYNGOLOGY

## 2023-11-02 NOTE — PROGRESS NOTES
Established patient with Dr. Wild. See previous note for written order. Allergy injections per Dr. Wild.   20 minute mm reaction  8:50 AM   right arm; observation 5mm   Left arm; observation 4mm.  Patient's wife is RN. She will give shots at home.  Took vials home.

## 2023-11-30 ENCOUNTER — CLINICAL SUPPORT (OUTPATIENT)
Dept: OTOLARYNGOLOGY | Facility: CLINIC | Age: 73
End: 2023-11-30
Payer: MEDICARE

## 2023-11-30 DIAGNOSIS — J30.1 ALLERGY TO TREES: ICD-10-CM

## 2023-11-30 DIAGNOSIS — J30.1 SEASONAL ALLERGIC RHINITIS DUE TO POLLEN: ICD-10-CM

## 2023-11-30 DIAGNOSIS — J30.1 ALLERGIC RHINITIS DUE TO GRASS POLLEN: ICD-10-CM

## 2023-11-30 DIAGNOSIS — J30.1 ALLERGIC REACTION TO WEED POLLEN: ICD-10-CM

## 2023-11-30 DIAGNOSIS — J32.8 OTHER CHRONIC SINUSITIS: Primary | ICD-10-CM

## 2023-11-30 DIAGNOSIS — J30.81 ALLERGY TO DOG DANDER: ICD-10-CM

## 2023-11-30 DIAGNOSIS — J30.81 ALLERGY TO DOGS: ICD-10-CM

## 2023-11-30 DIAGNOSIS — J30.1 NON-SEASONAL ALLERGIC RHINITIS DUE TO POLLEN: ICD-10-CM

## 2023-11-30 DIAGNOSIS — J30.1 ALLERGIC RHINITIS DUE TO TREE POLLEN: ICD-10-CM

## 2023-11-30 DIAGNOSIS — J30.81 ALLERGIC TO DOGS: ICD-10-CM

## 2023-11-30 DIAGNOSIS — J30.89 ENVIRONMENTAL AND SEASONAL ALLERGIES: ICD-10-CM

## 2023-11-30 DIAGNOSIS — J30.81 ALLERGIC RHINITIS DUE TO ANIMAL HAIR AND DANDER: ICD-10-CM

## 2023-11-30 DIAGNOSIS — J30.81 PET ALLERGY: ICD-10-CM

## 2023-11-30 DIAGNOSIS — J30.1 ALLERGIC REACTION TO TREE POLLEN: ICD-10-CM

## 2023-11-30 DIAGNOSIS — J30.81 ALLERGIC RHINITIS DUE TO DOGS: ICD-10-CM

## 2023-11-30 DIAGNOSIS — J30.81 ALLERGIC RHINITIS DUE TO DOG HAIR: ICD-10-CM

## 2023-11-30 DIAGNOSIS — J33.9 NASAL POLYPS: ICD-10-CM

## 2023-11-30 DIAGNOSIS — J31.0 CHRONIC RHINITIS: ICD-10-CM

## 2023-11-30 DIAGNOSIS — J30.1 ALLERGIC RHINITIS DUE TO WEED POLLEN: ICD-10-CM

## 2023-11-30 DIAGNOSIS — R09.81 CHRONIC NASAL CONGESTION: ICD-10-CM

## 2023-11-30 DIAGNOSIS — J30.1 ALLERGIC REACTION TO GRASS POLLEN: ICD-10-CM

## 2023-11-30 PROCEDURE — 95117 IMMUNOTHERAPY INJECTIONS: CPT | Mod: PBBFAC | Performed by: OTOLARYNGOLOGY

## 2023-11-30 PROCEDURE — 95165 ANTIGEN THERAPY SERVICES: CPT | Mod: S$PBB,,, | Performed by: OTOLARYNGOLOGY

## 2023-11-30 PROCEDURE — 95165 ANTIGEN THERAPY SERVICES: CPT | Mod: PBBFAC | Performed by: OTOLARYNGOLOGY

## 2023-11-30 PROCEDURE — 95165 PR PROFES SVC,IMMUNOTHER,SINGLE/MULT AGS: ICD-10-PCS | Mod: S$PBB,,, | Performed by: OTOLARYNGOLOGY

## 2023-11-30 NOTE — PROGRESS NOTES
Established patient with Dr. Wild. See previous note for written order. Allergy injections per Dr. Wild.   Vial test administered from new vials B.  10 minute vial test mm reactions  Administered right arm at 10:10 AM  3mm observed   Left arm; 3mm observed.  First dose of 0.02ml given.  20 minute mm reaction  10:11 AM   right arm; observation 5mm   Left arm; observation 4mm.  Took vials home. Wife is RN and will give weekly injections.

## 2024-01-04 ENCOUNTER — CLINICAL SUPPORT (OUTPATIENT)
Dept: OTOLARYNGOLOGY | Facility: CLINIC | Age: 74
End: 2024-01-04
Payer: MEDICARE

## 2024-01-04 DIAGNOSIS — J31.0 CHRONIC RHINITIS: ICD-10-CM

## 2024-01-04 DIAGNOSIS — J30.1 ALLERGIC RHINITIS DUE TO GRASS POLLEN: ICD-10-CM

## 2024-01-04 DIAGNOSIS — J30.1 ALLERGIC REACTION TO WEED POLLEN: ICD-10-CM

## 2024-01-04 DIAGNOSIS — J30.1 NON-SEASONAL ALLERGIC RHINITIS DUE TO POLLEN: ICD-10-CM

## 2024-01-04 DIAGNOSIS — J30.1 ALLERGIC RHINITIS DUE TO WEED POLLEN: ICD-10-CM

## 2024-01-04 DIAGNOSIS — J30.81 ALLERGY TO DOG DANDER: ICD-10-CM

## 2024-01-04 DIAGNOSIS — J30.1 SEASONAL ALLERGIC RHINITIS DUE TO POLLEN: Primary | ICD-10-CM

## 2024-01-04 DIAGNOSIS — J30.1 ALLERGIC REACTION TO TREE POLLEN: ICD-10-CM

## 2024-01-04 DIAGNOSIS — J30.1 ALLERGIC RHINITIS DUE TO TREE POLLEN: ICD-10-CM

## 2024-01-04 DIAGNOSIS — J30.81 ALLERGIC RHINITIS DUE TO ANIMAL HAIR AND DANDER: ICD-10-CM

## 2024-01-04 DIAGNOSIS — J30.81 ALLERGIC TO DOGS: ICD-10-CM

## 2024-01-04 DIAGNOSIS — R09.81 CHRONIC NASAL CONGESTION: ICD-10-CM

## 2024-01-04 DIAGNOSIS — J30.89 ENVIRONMENTAL AND SEASONAL ALLERGIES: ICD-10-CM

## 2024-01-04 DIAGNOSIS — J30.81 ALLERGIC RHINITIS DUE TO DOGS: ICD-10-CM

## 2024-01-04 DIAGNOSIS — J30.81 ALLERGY TO DOGS: ICD-10-CM

## 2024-01-04 DIAGNOSIS — J30.81 PET ALLERGY: ICD-10-CM

## 2024-01-04 DIAGNOSIS — J30.1 ALLERGIC REACTION TO GRASS POLLEN: ICD-10-CM

## 2024-01-04 DIAGNOSIS — J30.1 ALLERGY TO TREES: ICD-10-CM

## 2024-01-04 DIAGNOSIS — J30.81 ALLERGIC RHINITIS DUE TO DOG HAIR: ICD-10-CM

## 2024-01-04 PROCEDURE — 95165 ANTIGEN THERAPY SERVICES: CPT | Mod: S$PBB,,, | Performed by: OTOLARYNGOLOGY

## 2024-01-04 PROCEDURE — 95117 IMMUNOTHERAPY INJECTIONS: CPT | Mod: PBBFAC | Performed by: OTOLARYNGOLOGY

## 2024-01-04 PROCEDURE — 95165 ANTIGEN THERAPY SERVICES: CPT | Mod: PBBFAC | Performed by: OTOLARYNGOLOGY

## 2024-01-04 NOTE — PROGRESS NOTES
Established patient with Dr. Wild. See previous note for written order. Allergy injections per Dr. Wild.   Vial test administered from new vial C.  10 minute vial test mm reactions  Administered right arm at 8:55 AM  3mm observed   Left arm; 3mm observed.  First dose of 0.02ml given.  20 minute mm reaction  8:56 AM   right arm; observation 4mm   Left arm; observation 3mm.  Took vials home.

## 2024-02-15 ENCOUNTER — CLINICAL SUPPORT (OUTPATIENT)
Dept: OTOLARYNGOLOGY | Facility: CLINIC | Age: 74
End: 2024-02-15
Payer: MEDICARE

## 2024-02-15 DIAGNOSIS — J30.1 SEASONAL ALLERGIC RHINITIS DUE TO POLLEN: Primary | ICD-10-CM

## 2024-02-15 DIAGNOSIS — J30.81 PET ALLERGY: ICD-10-CM

## 2024-02-15 DIAGNOSIS — J30.81 ALLERGIC RHINITIS DUE TO DOGS: ICD-10-CM

## 2024-02-15 DIAGNOSIS — J30.81 ALLERGIC TO DOGS: ICD-10-CM

## 2024-02-15 DIAGNOSIS — J30.1 ALLERGIC RHINITIS DUE TO WEED POLLEN: ICD-10-CM

## 2024-02-15 DIAGNOSIS — R09.81 CHRONIC NASAL CONGESTION: ICD-10-CM

## 2024-02-15 DIAGNOSIS — J30.81 ALLERGIC RHINITIS DUE TO ANIMAL HAIR AND DANDER: ICD-10-CM

## 2024-02-15 DIAGNOSIS — J30.1 ALLERGY TO TREES: ICD-10-CM

## 2024-02-15 DIAGNOSIS — J30.89 ENVIRONMENTAL AND SEASONAL ALLERGIES: ICD-10-CM

## 2024-02-15 DIAGNOSIS — J30.81 ALLERGY TO DOGS: ICD-10-CM

## 2024-02-15 DIAGNOSIS — J30.1 ALLERGIC RHINITIS DUE TO TREE POLLEN: ICD-10-CM

## 2024-02-15 DIAGNOSIS — J30.81 ALLERGY TO DOG DANDER: ICD-10-CM

## 2024-02-15 DIAGNOSIS — J30.1 ALLERGIC RHINITIS DUE TO GRASS POLLEN: ICD-10-CM

## 2024-02-15 DIAGNOSIS — J30.1 ALLERGIC REACTION TO TREE POLLEN: ICD-10-CM

## 2024-02-15 DIAGNOSIS — J30.1 ALLERGIC REACTION TO GRASS POLLEN: ICD-10-CM

## 2024-02-15 DIAGNOSIS — J30.1 ALLERGIC REACTION TO WEED POLLEN: ICD-10-CM

## 2024-02-15 DIAGNOSIS — J31.0 CHRONIC RHINITIS: ICD-10-CM

## 2024-02-15 DIAGNOSIS — J30.81 ALLERGIC RHINITIS DUE TO DOG HAIR: ICD-10-CM

## 2024-02-15 DIAGNOSIS — J30.1 NON-SEASONAL ALLERGIC RHINITIS DUE TO POLLEN: ICD-10-CM

## 2024-02-15 PROCEDURE — 95117 IMMUNOTHERAPY INJECTIONS: CPT | Mod: PBBFAC | Performed by: OTOLARYNGOLOGY

## 2024-02-15 PROCEDURE — 95165 ANTIGEN THERAPY SERVICES: CPT | Mod: S$PBB,,, | Performed by: OTOLARYNGOLOGY

## 2024-02-15 PROCEDURE — 95165 ANTIGEN THERAPY SERVICES: CPT | Mod: PBBFAC | Performed by: OTOLARYNGOLOGY

## 2024-02-15 NOTE — PROGRESS NOTES
Established patient with Dr. Wild. See previous note for written order. Allergy injections per Dr. Wild.   Vial test administered from new vial D.  10 minute vial test mm reactions  Administered right arm at 1:21 PM  3mm observed   Left arm; 3mm observed.  First dose of 0.02ml given.  20 minute mm reaction  1:21 PM   right arm; observation 9mm   Left arm; observation 5mm.  Wife is RN and will continue to give weekly injections at home.

## 2024-02-28 ENCOUNTER — OFFICE VISIT (OUTPATIENT)
Dept: OTOLARYNGOLOGY | Facility: CLINIC | Age: 74
End: 2024-02-28
Payer: MEDICARE

## 2024-02-28 VITALS — BODY MASS INDEX: 29.82 KG/M2 | HEIGHT: 67 IN | WEIGHT: 190 LBS

## 2024-02-28 DIAGNOSIS — R09.81 CHRONIC NASAL CONGESTION: Primary | ICD-10-CM

## 2024-02-28 DIAGNOSIS — J30.81 ALLERGIC RHINITIS DUE TO DOG HAIR: ICD-10-CM

## 2024-02-28 DIAGNOSIS — J32.8 OTHER CHRONIC SINUSITIS: ICD-10-CM

## 2024-02-28 PROCEDURE — 99213 OFFICE O/P EST LOW 20 MIN: CPT | Mod: PBBFAC | Performed by: OTOLARYNGOLOGY

## 2024-02-28 PROCEDURE — 99214 OFFICE O/P EST MOD 30 MIN: CPT | Mod: S$PBB,,, | Performed by: OTOLARYNGOLOGY

## 2024-02-28 RX ORDER — CLINDAMYCIN HYDROCHLORIDE 300 MG/1
300 CAPSULE ORAL 2 TIMES DAILY
Qty: 28 CAPSULE | Refills: 0 | Status: SHIPPED | OUTPATIENT
Start: 2024-02-28

## 2024-02-28 NOTE — PROGRESS NOTES
Subjective:       Patient ID: Vel Torres is a 73 y.o. male.    Chief Complaint: Follow-up (6 month follow up. Patient states he doesn't think his allergy shots are working.) and Sinusitis (Patient complains of sinus congestion, drainage and pressure. States he is using saline wash every morning. He is seeing greenish mucous out of his right nostril.)    Follow-up    Sinusitis      Review of Systems   HENT:  Positive for postnasal drip and rhinorrhea.    All other systems reviewed and are negative.      Objective:      Physical Exam  General: NAD  Head: Normocephalic, atraumatic, no facial asymmetry/normal strength,  Ears: Both auricules normal in appearance, w/o deformities tympanic membranes normal external auditory canals normal  Nose: External nose w/o deformities swollen turbinates + drainage or inflammation  Oral Cavity: Lips, gums, floor of mouth, tongue hard palate, and buccal mucosa without mass/lesion  Oropharynx: Mucosa pink and moist, soft palate, posterior pharynx and oropharyngeal wall without mass/lesion  Neck: Supple, symmetric, trachea midline, no palpable mass/lesion, no palpable cervical lymphadenopathy  Skin: Warm and dry, no concerning lesions  Respiratory: Respirations even, unlabored  Assessment:       1. Chronic nasal congestion    2. Allergic rhinitis due to dog hair    3. Other chronic sinusitis        Plan:       Cleocin   Continue allergy shots   F/u 3 months

## 2024-07-15 ENCOUNTER — CLINICAL SUPPORT (OUTPATIENT)
Dept: OTOLARYNGOLOGY | Facility: CLINIC | Age: 74
End: 2024-07-15
Payer: MEDICARE

## 2024-07-15 DIAGNOSIS — J30.81 ALLERGIC RHINITIS DUE TO ANIMAL HAIR AND DANDER: ICD-10-CM

## 2024-07-15 DIAGNOSIS — J33.9 NASAL POLYPS: ICD-10-CM

## 2024-07-15 DIAGNOSIS — J30.81 ALLERGIC TO DOGS: ICD-10-CM

## 2024-07-15 DIAGNOSIS — J30.89 ALLERGIC RHINITIS DUE TO INSECT: ICD-10-CM

## 2024-07-15 DIAGNOSIS — J30.89 ALLERGIC RHINITIS DUE TO DERMATOPHAGOIDES FARINAE: ICD-10-CM

## 2024-07-15 DIAGNOSIS — J31.0 CHRONIC RHINITIS: ICD-10-CM

## 2024-07-15 DIAGNOSIS — J30.1 NON-SEASONAL ALLERGIC RHINITIS DUE TO POLLEN: ICD-10-CM

## 2024-07-15 DIAGNOSIS — J30.1 ALLERGIC REACTION TO WEED POLLEN: ICD-10-CM

## 2024-07-15 DIAGNOSIS — J30.1 ALLERGIC REACTION TO GRASS POLLEN: ICD-10-CM

## 2024-07-15 DIAGNOSIS — J30.1 ALLERGIC RHINITIS DUE TO GRASS POLLEN: ICD-10-CM

## 2024-07-15 DIAGNOSIS — J30.81 PET ALLERGY: ICD-10-CM

## 2024-07-15 DIAGNOSIS — J30.1 ALLERGIC RHINITIS DUE TO TREE POLLEN: ICD-10-CM

## 2024-07-15 DIAGNOSIS — J30.1 ALLERGY TO TREES: ICD-10-CM

## 2024-07-15 DIAGNOSIS — J30.89 OTHER ALLERGIC RHINITIS: ICD-10-CM

## 2024-07-15 DIAGNOSIS — J30.1 ALLERGIC RHINITIS DUE TO WEED POLLEN: ICD-10-CM

## 2024-07-15 DIAGNOSIS — J30.81 ALLERGIC RHINITIS DUE TO DOGS: ICD-10-CM

## 2024-07-15 DIAGNOSIS — J30.81 ALLERGIC RHINITIS DUE TO DOG HAIR: ICD-10-CM

## 2024-07-15 DIAGNOSIS — J30.89 ALLERGIC RHINITIS DUE TO DERMATOPHAGOIDES PTERONYSSINUS: ICD-10-CM

## 2024-07-15 DIAGNOSIS — J30.1 ALLERGIC REACTION TO TREE POLLEN: ICD-10-CM

## 2024-07-15 DIAGNOSIS — J30.89 ALLERGIC RHINITIS DUE TO HOUSE DUST MITE: ICD-10-CM

## 2024-07-15 DIAGNOSIS — J30.89 ENVIRONMENTAL AND SEASONAL ALLERGIES: ICD-10-CM

## 2024-07-15 DIAGNOSIS — J30.1 SEASONAL ALLERGIC RHINITIS DUE TO POLLEN: ICD-10-CM

## 2024-07-15 DIAGNOSIS — J30.81 ALLERGY TO DOGS: ICD-10-CM

## 2024-07-15 DIAGNOSIS — R09.81 CHRONIC NASAL CONGESTION: Primary | ICD-10-CM

## 2024-07-15 DIAGNOSIS — J30.89 ALLERGIC RHINITIS DUE TO MOLD: ICD-10-CM

## 2024-07-15 DIAGNOSIS — J30.81 ALLERGY TO DOG DANDER: ICD-10-CM

## 2024-07-15 PROCEDURE — 95117 IMMUNOTHERAPY INJECTIONS: CPT | Mod: PBBFAC | Performed by: OTOLARYNGOLOGY

## 2024-07-15 PROCEDURE — 95165 ANTIGEN THERAPY SERVICES: CPT | Mod: S$PBB,,, | Performed by: OTOLARYNGOLOGY

## 2024-07-15 PROCEDURE — 95165 ANTIGEN THERAPY SERVICES: CPT | Mod: PBBFAC | Performed by: OTOLARYNGOLOGY

## 2024-07-15 PROCEDURE — 99999 PR PBB SHADOW E&M-EST. PATIENT-LVL II: CPT | Mod: PBBFAC,,,

## 2024-07-15 NOTE — PROGRESS NOTES
Established patient with Dr. Wild. See previous note for written order. Allergy injections per Dr. Wild.   Vial test administered from new maintenance vials.  10 minute vial test mm reactions  Administered right arm at 9:36 AM  3mm observed   Left arm; 4mm observed.  First dose of 0.15ml given.  20 minute mm reaction  9:36 AM   right arm; observation 5mm   Left arm; observation 5mm.  Took vials home. Wife is RN and will continue giving weekly allergy injections.

## 2024-09-04 ENCOUNTER — OFFICE VISIT (OUTPATIENT)
Dept: OTOLARYNGOLOGY | Facility: CLINIC | Age: 74
End: 2024-09-04
Payer: MEDICARE

## 2024-09-04 VITALS — HEIGHT: 67 IN | WEIGHT: 190 LBS | BODY MASS INDEX: 29.82 KG/M2

## 2024-09-04 DIAGNOSIS — J30.1 SEASONAL ALLERGIC RHINITIS DUE TO POLLEN: Primary | ICD-10-CM

## 2024-09-04 DIAGNOSIS — R09.81 CHRONIC NASAL CONGESTION: ICD-10-CM

## 2024-09-04 DIAGNOSIS — R06.7 SNEEZING: ICD-10-CM

## 2024-09-04 PROCEDURE — 99214 OFFICE O/P EST MOD 30 MIN: CPT | Mod: PBBFAC | Performed by: OTOLARYNGOLOGY

## 2024-09-04 PROCEDURE — 99214 OFFICE O/P EST MOD 30 MIN: CPT | Mod: S$PBB,,, | Performed by: OTOLARYNGOLOGY

## 2024-09-04 PROCEDURE — 99999 PR PBB SHADOW E&M-EST. PATIENT-LVL IV: CPT | Mod: PBBFAC,,, | Performed by: OTOLARYNGOLOGY

## 2024-09-04 NOTE — PROGRESS NOTES
Subjective:       Patient ID: Vel Torres is a 74 y.o. male.    Chief Complaint: Allergic Rhinitis  (Pt states he has been taking weekly allergy shots x 1 year and shots don't seem to be working--continues to have rhinitis. )    HPI  Review of Systems   HENT:  Positive for congestion and sneezing.    All other systems reviewed and are negative.      Objective:      Physical Exam  General: NAD  Head: Normocephalic, atraumatic, no facial asymmetry/normal strength,  Ears: Both auricules normal in appearance, w/o deformities tympanic membranes normal external auditory canals normal  Nose: External nose w/o deformities normal turbinates no drainage or inflammation  Oral Cavity: Lips, gums, floor of mouth, tongue hard palate, and buccal mucosa without mass/lesion  Oropharynx: Mucosa pink and moist, soft palate, posterior pharynx and oropharyngeal wall without mass/lesion  Neck: Supple, symmetric, trachea midline, no palpable mass/lesion, no palpable cervical lymphadenopathy  Skin: Warm and dry, no concerning lesions  Respiratory: Respirations even, unlabored  Assessment:       1. Seasonal allergic rhinitis due to pollen    2. Chronic nasal congestion    3. Sneezing        Plan:       Continue shots astelin xyzal  F/u 6 months

## (undated) DEVICE — SOL NACL IRR 1000ML BTL

## (undated) DEVICE — SPONGE CODMAN SURG NEUR .5X1.5

## (undated) DEVICE — Device

## (undated) DEVICE — GLOVE BIOGEL SKINSENSE PI 7.5

## (undated) DEVICE — WIPE DELICATE TASK LAB 4X8IN

## (undated) DEVICE — SYR 10CC LUER LOCK